# Patient Record
Sex: FEMALE | Race: AMERICAN INDIAN OR ALASKA NATIVE
[De-identification: names, ages, dates, MRNs, and addresses within clinical notes are randomized per-mention and may not be internally consistent; named-entity substitution may affect disease eponyms.]

---

## 2018-09-22 NOTE — PDOC
History of Present Illness





- General


Chief Complaint: Chest Pain


Stated Complaint: PAIN, ACUTE





- History of Present Illness


Initial Comments: 


The patient is a 58F w/ a history of T2DM and HTN who presents for 2d of 

intermittent, sharp/burning, non-radiating, left-sided chest pain, that 

radiates towards her flank as a 'tingling' sensation. The patient is not able 

to identify any exacerbating or alleviating factors. The patient has never had 

pain like this before. She states it most commonly occurs at rest and will 

resolve spontaneously from minutes to hours. The patient denies fevers/chills, 

SOB, abdominal pain, N/V/C/D, dysuria, or blood in her urine or stool. She 

denies rash, injury, or recent cough/illness.


18 19:43








Past History





- Past Medical History


Allergies/Adverse Reactions: 


 Allergies











Allergy/AdvReac Type Severity Reaction Status Date / Time


 


Sulfa (Sulfonamide Allergy Intermediate Hives Verified 18 20:24





Antibiotics)     











Home Medications: 


Ambulatory Orders





metFORMIN XR [Glucophage Xr -] 500 mg PO BID 04/28/15 


Aspirin 81 mg PO DAILY 18 


Liraglutide [Victoza -] 1.2 mg SQ DAILY@0700 18 


Metoprolol Succinate [Toprol Xl] 25 mg PO DAILY 18 








COPD: No


Diabetes: Yes


HTN: Yes


Hypercholesterolemia: Yes





- Suicide/Smoking/Psychosocial Hx


Smoking History: Never smoked


Hx Alcohol Use: No


Drug/Substance Use Hx: No





**Review of Systems





- Review of Systems


Able to Perform ROS?: Yes


Comments:: 





GENERAL/CONSTITUTIONAL: No fever or chills. No weakness


HEAD, EYES, EARS, NOSE AND THROAT: No acute change in vision. No ear pain or 

discharge. No sore throat


CARDIOVASCULAR: per HPI


RESPIRATORY: No cough, wheezing, or hemoptysis


GASTROINTESTINAL: No nausea, vomiting, diarrhea or constipation


GENITOURINARY: No dysuria, frequency, or change in urination


MUSCULOSKELETAL: No joint or muscle swelling or pain. No neck or back pain


SKIN: No rash


NEUROLOGIC: No vertigo, loss of consciousness, or change in strength/sensation


HEMATOLOGIC/LYMPHATIC: No anemia, easy bleeding, or history of blood clots


ALLERGIC/IMMUNOLOGIC: No hives or skin allergy


18 20:32





Is the patient limited English proficient: No





*Physical Exam





- Vital Signs


 Last Vital Signs











Temp Pulse Resp BP Pulse Ox


 


 98.7 F   90   20   156/79   99 


 


 18 19:00  18 19:00  18 19:00  18 19:00  18 19:00














- Physical Exam


Comments: 





GENERAL: Awake, alert, and fully oriented, in no acute distress


HEAD: No signs of trauma, normocephalic, atraumatic 


EYES: PERRL, EOMI, sclera anicteric, conjunctiva clear


ENT: Hearing grossly normal, nares patent, oropharynx clear without exudates. 

Moist mucosa


NECK: Normal ROM, supple, no lymphadenopathy


CHEST: No rash, no TTP on lateral compression 


LUNGS: No distress, speaks full sentences, clear to auscultation bilaterally


HEART:Regular rate and rhythm, normal S1 and S2, no murmurs appreciated, 

peripheral pulses normal and equal bilaterally


ABDOMEN: Soft, nontender, normoactive bowel sounds.  No guarding, no rebound.  

No masses


EXTREMITIES : Normal inspection, Normal range of motion, no edema.  No clubbing 

or cyanosis


NEUROLOGICAL: Cranial nerves II through XII grossly intact.  Normal speech, 

normal gait, no focal sensorimotor deficits


SKIN: Warm, Dry, normal turgor, no rashes or lesions noted





18 20:33








**Heart Score/ECG Review





- History


History: Slightly suspicious





- Electrocardiogram


EKG: Normal





- Age


Age: 45-65





- Risk Factors


Risk Factors Heart Score: Yes Hx Hypertension, Yes Hx Diabetes, Yes Hx Obesity


Based on the list above the patient has:: >/=3 risk factors or Hx 

atherosclerotic disease





- Troponin


Troponin: </= normal limit





- Score


Heart Score - Total: 3





ED Treatment Course





- LABORATORY


CBC & Chemistry Diagram: 


 18 19:40





 18 19:40





- RADIOLOGY


Radiology Studies Ordered: 














 Category Date Time Status


 


 CHEST PA & LAT [RAD] Stat Radiology  18 19:38 Ordered














Medical Decision Making





- Medical Decision Making


The patient is a 58F w/ a history of T2DM and HTN who presents with 2d of L-

sided, intermittent, sharp/burning chest pain that occurs mostly at rest.


FHx sig for F  2/2 MI and Brother with hx of MI


HEART Score 3





ED Course


CMP, CBC, Cardiac Profile


ECG


CXR





Will plan for Obs Tele admission for ACS r/o given strong family history and 

new onset of symptoms


18 19:53





Initial Trop I neg


CXR w/o evidence of acute cardiopulmonary pathology


18 20:40





Lytes wnl


No leukocytosis


ECG NSR without signs of acute ischemia


Patient states pain is still 4-5/10 at this time but does not desire pain 

medication. She is laying comfortably in bed, in no distress, speaking in full 

sentences on room air.





Dispo: Tele obs for ACS r/o. Will repeat Trop I at three hours





18 21:04








*DC/Admit/Observation/Transfer


Diagnosis at time of Disposition: 


 ACS (acute coronary syndrome)





HTN (hypertension)


Qualifiers:


 Hypertension type: unspecified Qualified Code(s): I10 - Essential (primary) 

hypertension





T2DM (type 2 diabetes mellitus)


Qualifiers:


 Diabetes mellitus long term insulin use: unspecified long term insulin use 

status Diabetes mellitus complication status: with unspecified complications 

Qualified Code(s): E11.8 - Type 2 diabetes mellitus with unspecified 

complications








- Discharge Dispostion


Condition at time of disposition: Good


Decision to Admit order: Yes





- Referrals





- Patient Instructions





- Post Discharge Activity

## 2018-09-22 NOTE — PN
Teaching Attending Note


Name of Resident: Juan Manuel Mckeon





ATTENDING PHYSICIAN STATEMENT





I saw and evaluated the patient.


I reviewed the resident's note and discussed the case with the resident.


I agree with the resident's findings and plan as documented.








SUBJECTIVE:


Patient is a 58 year old woman with a history of NIDDM and HTN who presents for 

intermittent, sharp/burning, non-radiating, left-sided chest pain for two days. 

Pain radiates towards her flank as a 'tingling' sensation. The patient is not 

able to identify any exacerbating or alleviating factors. The patient has never 

had pain like this before. She states it most commonly occurs at rest and will 

resolve spontaneously from minutes to hours. The patient denies fevers/chills, 

SOB, abdominal pain, nausea, vomiting or dysuria. She has a FH of CAD.





OBJECTIVE:


Alert


 Vital Signs











 Period  Temp  Pulse  Resp  BP Sys/Clarke  Pulse Ox


 


 Last 24 Hr  98.7 F  82-90  17-20  156-170/79-95  98-99








HEENT: No Jaundice, eye redness or discharge, PERRLA, EOMI. Normocephalic, 

atraumatic. External ears are normal and hearing is grossly intact. No nasal 

discharge.


Neck: Supple, nontender. No palpable adenopathy or thyromegaly. No JVD


Chest: Good effort. Clear to auscultation and percussion.


Heart: Regular. No S3, rub or murmur


Abdomen: Not distended, soft, nontender and no HSM. No rebound or guarding.  

Normoactive bowel sounds.


Ext: Peripheral pulses intact. No leg edema.


Skin: Warm and dry. No petechiae, rash or ecchymosis.


Neuro: Alert. Oriented x3. CN 2-12 grossly intact. Sensation grossly intact in 

all four extremities and DTR are symmetric.


 Home Medications











 Medication  Instructions  Recorded


 


metFORMIN XR [Glucophage Xr -] 500 mg PO BID 04/28/15


 


Aspirin 81 mg PO DAILY 09/22/18


 


Liraglutide [Victoza -] 1.2 mg SQ DAILY@0700 09/22/18


 


Metoprolol Succinate [Toprol Xl] 25 mg PO DAILY 09/22/18








 Abnormal Lab Results











  09/22/18 09/22/18





  19:40 19:40


 


WBC  12.6 H 


 


MCV  76.6 L 


 


MCH  25.3 L 


 


Chloride   108 H


 


Total Bilirubin   1.1 H








ASSESSMENT AND PLAN:


1. Chest pain - Pain is atypical, but she has risk factors as well as a family 

history of CAD. No acute changes of ACS on EKG. Will admit to telemetry to rule 

out ACS. Get ECHO, fasting lipids and consult cardiology. Add Lisinopril 20 mg 

po bid and strive for normotension. Leukocytosis is unexplained. CXR show RLL 

atelectasis. She is afebrile. Will repeat CBC. 





2. DM - For now, we will hold the home diabetes drugs and implement sliding 

scale insulin regimen. Provide comprehensive diabetes care with patient 

teaching and counseling about the importance of euglycemia, eye care and foot 

care.





3. Obesity - Will provide patient all the necessary assistance , counseling and 

positive reinforcement to facilitate weight loss. Consult dietician.





4. DVT prophylaxis - Lovenox 40 mg SQ q 24 hours.





5. Advance directives - Full code

## 2018-09-22 NOTE — PDOC
Attending Attestation





- HPI


HPI: 





09/22/18 20:55


The patient is a 58-year-old female with past medical history significant for 

HTN, HLD, and DM presents to the emergency department with 2 days of chest 

pain. The patient reports the symptoms have been intermittent since onset. The 

patient indicates the symptoms are located on the L. lower chest pain, burning 

in character, with intermittent diffusion across the chest. The patient states 

the pain is strongest while shes driving or sleeping on her side, with a 

tingling sensation radiating down the L. flank region.  The patient reports the 

symptoms are slightly improved by remaining still and denies exacerbation with 

deep breathing. The patient reports an additional concern of a headache. Denies 

recent illness, shortness of breath, nausea, vomiting, diarrhea, constipation, 

cough or injury to the chest wall. 


Allergies: Sulfa 


Social history: No past or present use of tobacco, alcohol or recreational drug 

use. 


Surgical history: None reported. 


PCP: Gaetano Koehler








- Physicial Exam


PE: 


09/22/18 20:57


GENERAL: The patient is in no acute distress.


LUNGS: Breath sounds equal, clear to auscultation bilaterally. No


wheezes, and no crackles.


HEART:Regular rate and rhythm, normal S1 and S2 without murmur, rub or gallop.


ABDOMEN: Soft, nontender, normoactive bowel sounds. No guarding, no


rebound. No masses palpable.


EXTREMITIES: Normal range of motion, no edema. No clubbing or cyanosis. No 

erythema, or tenderness.





- Medical Decision Making








09/22/18 20:55





Documentation prepared by Kristine Maharaj, acting as medical scribe for Jolanta Hare MD. 





<Kristine Maharaj - Last Filed: 09/22/18 20:57>





- Resident


Resident Name: Roman Russo





- ED Attending Attestation


I have performed the following: I have examined & evaluated the patient, The 

case was reviewed & discussed with the resident, I agree w/resident's findings 

& plan, Exceptions are as noted





- Medical Decision Making





09/22/18 20:41


59 yo F presenting to the ER with 3 days intermittent chest pain


No shortness of breath, nausea, diaphoresis


Pt h/o HTN, DM, strong family history.





Will plan to place on observation


After labs


09/22/18 20:54


 Laboratory Tests











  09/22/18





  19:40


 


Creatine Kinase  66


 


Troponin I  < 0.02








CXR:


EKG: Twelve-lead EKG was performed and reviewed by me. There is normal sinus 

rhythm with a normal rate. The axis is normal. The intervals are normal. There 

are no ST or T wave abnormalities.





Impression: Normal twelve-lead EKG





09/22/18 22:05


Place on observation


Clinical Impression: chest pain, initial presentation 





<Jolanta Hare - Last Filed: 09/24/18 07:51>





**Heart Score/ECG Review





- History


History: Slightly suspicious





- Electrocardiogram


EKG: Normal





- Age


Age: 45-65





- Risk Factors


Risk Factors Heart Score: Yes Hx Hypertension, Yes Hx Diabetes, Yes Hx Obesity


Based on the list above the patient has:: >/=3 risk factors or Hx 

atherosclerotic disease





- Troponin


Troponin: </= normal limit





- Score


Heart Score - Total: 3





<Jolanta Hare - Last Filed: 09/24/18 07:51>

## 2018-09-22 NOTE — HP
CHIEF COMPLAINT: chest pain





PCP:





HISTORY OF PRESENT ILLNESS:


Patient is a 58 year old female with history significant for anxiety, depression

, HTN, HLD, DM, presents with chest pain ongoing for the past 2-3 days. Pain is 

sharp, localized to the midline chest, as well as left lateral chest, and 

radiates interiorly along the flank, described as "tingling" in nature. States 

that the pain occurs mainly during rest when she is lying down and is 

associated with headache. Headache described as tightness around the head, with 

blurry vision that resolves shortly afterwards. Denies falls, trauma to head, 

loss of consciousness. Denies fevers, chills, shortness of breath, abdominal 

pain, nausea, vomiting, diarrhea, constipation, dysuria, hematuria.








ER course was notable for:


(1) CBC, CMP


(2) EKG, Troponin 0.02 


(3) CXR





Recent Travel:





PAST MEDICAL HISTORY: anxiety, depression, HTN, HLD, DM


PAST SURGICAL HISTORY:





Social History:


Smoking: denies


Alcohol: denies


Drugs: denies





Family History:


Allergies





Sulfa (Sulfonamide Antibiotics) Allergy (Intermediate, Verified 09/22/18 20:24)


 Hives








HOME MEDICATIONS:


 Home Medications











 Medication  Instructions  Recorded


 


metFORMIN XR [Glucophage Xr -] 500 mg PO BID 04/28/15


 


Aspirin 81 mg PO DAILY 09/22/18


 


Liraglutide [Victoza -] 1.2 mg SQ DAILY@0700 09/22/18


 


Metoprolol Succinate [Toprol Xl] 25 mg PO DAILY 09/22/18








REVIEW OF SYSTEMS


CONSTITUTIONAL: 


Absent:  fever, chills, diaphoresis, generalized weakness.


HEENT: 


ADmits: blurry vision. Absent:  rhinorrhea, nasal congestion, throat swelling, 

difficulty swallowing, mouth swelling,


CARDIOVASCULAR: 


Admits: chest pain, palpitations, Absent: syncope, irregular heart rate, 

peripheral edema


RESPIRATORY: 


Absent: cough, shortness of breath, wheezing, hemoptysis


GASTROINTESTINAL:


Absent: abdominal pain, abdominal distension, nausea, vomiting, diarrhea, 

constipation, melena, hematochezia


GENITOURINARY: 


Absent: dysuria, frequency, urgency, hesitancy, hematuria,


SKIN: 


Absent: rash, pruritis, pallor


NEUROLOGIC: 


Admits headache, parasthesias. Absent: focal weakness, dizziness, unsteady gait

, bladder or bowel incontinence


PSYCHIATRIC: 


Admits: anxiety, depression. Absent: suicidal or homicidal ideation, 

hallucinations.








PHYSICAL EXAMINATION


 Vital Signs - 24 hr











  09/22/18 09/22/18





  19:00 20:28


 


Temperature 98.7 F 


 


Pulse Rate 90 82


 


Pulse Rate [  82





Left Radial]  


 


Respiratory 20 17





Rate  


 


Blood Pressure 156/79 


 


Blood Pressure  170/95





[Right]  


 


O2 Sat by Pulse 99 98





Oximetry (%)  











GENERAL: Awake, alert, and fully oriented, in no acute distress.


HEAD: Normal with no signs of trauma.


EYES: Pupils equal, round and reactive to light, extraocular movements intact, 

sclera icteric b/l, conjunctiva clear. 


EARS, NOSE, THROAT: Ears normal, nares patent, oropharynx clear without 

exudates. Moist mucous membranes.


NECK: Normal range of motion, supple without lymphadenopathy.


LUNGS: Breath sounds equal, clear to auscultation bilaterally. No wheezes, and 

no crackles. No accessory muscle use.


HEART: Regular rate and rhythm, normal S1 and S2 with 2/6 holosystolic murmur 

at left sternal border.


Chest pain partially reproducible upon palpation, however not at full intensity 

that she feels.


ABDOMEN: Soft, nontender, not distended, normoactive bowel sounds, no guarding, 

no rebound, no masses.  No hepatomegaly or  splenomegaly. 


MUSCULOSKELETAL: Normal range of motion at all joints. No bony deformities or 

tenderness.


UPPER EXTREMITIES: 2+ pulses, warm. Strength 5/5 b/l upper extremities.


LOWER EXTREMITIES: 2+ pulses, warm, well-perfused. No calf tenderness b/l. No 

peripheral edema b/l. Strength 5/5 b/l lower extremities. 


NEUROLOGICAL:  Cranial nerves II-XII intact. Normal speech.  


PSYCHIATRIC: Cooperative. Good eye contact. Appropriate mood and affect upon my 

encounter.


SKIN: Warm, dry, normal turgor, no rashes or lesions noted.





 Laboratory Results - last 24 hr











  09/22/18 09/22/18





  19:40 19:40


 


WBC  12.6 H 


 


RBC  4.64 


 


Hgb  11.8 


 


Hct  35.6 


 


MCV  76.6 L 


 


MCH  25.3 L 


 


MCHC  33.0 


 


RDW  14.1 


 


Plt Count  350 


 


MPV  8.6 


 


Sodium   142


 


Potassium   4.4


 


Chloride   108 H


 


Carbon Dioxide   26


 


Anion Gap   8


 


BUN   10


 


Creatinine   0.6


 


Creat Clearance w eGFR   > 60


 


Random Glucose   75


 


Calcium   9.5


 


Total Bilirubin   1.1 H


 


AST   16


 


ALT   31


 


Alkaline Phosphatase   79


 


Creatine Kinase   66


 


Troponin I   < 0.02


 


Total Protein   7.7


 


Albumin   3.9











ASSESSMENT/PLAN:


Patient is a 58 year old female with history significant for anxiety, depression

, HTN, HLD, DM, presents with chest pain ongoing for the past 2-3 days.





Chest pain


-Atypical, partially reproducible upon palpation. Associated with anxiety-

provoking thoughts, occurign at rest.  


-EKG showed NSR at 86 BPM without ischemic changes


-First troponin 0.02


-F/U serial troponins, and EKG


-Cardiac monitoring


-F/U cardiac ECHO


-F/U lipid panel


-F/U cardiology consult (Dr. Joanna Krause)





Leukocytosis


-Unclear etiology


-CXR shows ?RLL atelectasis. Will F/U official read.


-F/U UA





Hypertension


-Continue Metoprolol 25mg PO daily


-Lisinopril 20mg PO BID





Diabetes


-Hold oral hypoglycemics


-ISS


-BGM ACHS





FEN


-No IV fluids. Encourage judicious oral hydration


-Will follow CMP


-Diabetic diet





Prophylaxis


-Lovenox 40mg subq TID





Disposition


-Telemetry observation





Visit type





- Emergency Visit


Emergency Visit: Yes


ED Registration Date: 09/22/18


Care time: The patient presented to the Emergency Department on the above date 

and was hospitalized for further evaluation of their emergent condition.





- New Patient


This patient is new to me today: Yes


Date on this admission: 09/23/18





- Critical Care


Critical Care patient: No





Hospitalist Screening





- Colonoscopy Questionnaire


Colonoscopy Questionnaire: 





Colonoscopy Questionnaire








-   Patient:


50 - 75 years old and never had a screening colonoscopy: Unknown


History of colon or rectal polyps, or CA: Unknown


History of IBD, Crohn's disease or UC: Unknown


History of abdominal radiation therapy as a child: Unknown





-   Relative:


1 with colon or rectal CA, or polyps at age 60 or younger: Unknown


Colon or rectal CA diagnosed at age 45 or younger: Unknown


Multiple relatives with colon or rectal CA: Unknown





-   Outcome:


Screening Result: Negative Screen

## 2018-09-23 NOTE — EKG
Test Reason : 

Blood Pressure : ***/*** mmHG

Vent. Rate : 089 BPM     Atrial Rate : 089 BPM

   P-R Int : 158 ms          QRS Dur : 076 ms

    QT Int : 376 ms       P-R-T Axes : 044 003 037 degrees

   QTc Int : 457 ms

 

NORMAL SINUS RHYTHM

NORMAL ECG

NO PREVIOUS ECGS AVAILABLE

Confirmed by MD Mckeon Daniel ([id number]) on 9/23/2018 5:06:40 PM

 

Referred By:             Confirmed By:Irving Mckeon MD

## 2018-09-23 NOTE — PN
Progress Note (short form)





- Note


Progress Note: 





Subjective:


No fever or chills. No abd pain, No HA , nO CP , no SOB. she reported few 

episodes of sharp CP under her L breast with tingling in LUQ in past 3 days . 

pain was severe to 10 /10 at some point. it would last 2-3 min and resolve. no 

relation to exertion.  never had Cp before. has strong family h/ premature CAD. 

had stress test in June /17 and echo and was told it was normal. 





Objective:








Vital Signs:


 Last Vital Signs











Temp Pulse Resp BP Pulse Ox


 


 98 F   88   18   140/80   98 


 


 09/23/18 09:00  09/23/18 09:00  09/23/18 09:00  09/23/18 09:00  09/23/18 09:00








 Laboratory Results - last 24 hr











  09/22/18 09/22/18 09/22/18





  19:40 19:40 23:00


 


WBC  12.6 H  


 


RBC  4.64  


 


Hgb  11.8  


 


Hct  35.6  


 


MCV  76.6 L  


 


MCH  25.3 L  


 


MCHC  33.0  


 


RDW  14.1  


 


Plt Count  350  


 


MPV  8.6  


 


Absolute Neuts (auto)   


 


Neutrophils %   


 


Lymphocytes %   


 


Monocytes %   


 


Eosinophils %   


 


Basophils %   


 


Nucleated RBC %   


 


Sodium   142 


 


Potassium   4.4 


 


Chloride   108 H 


 


Carbon Dioxide   26 


 


Anion Gap   8 


 


BUN   10 


 


Creatinine   0.6 


 


Creat Clearance w eGFR   > 60 


 


POC Glucometer   


 


Random Glucose   75 


 


Calcium   9.5 


 


Phosphorus   


 


Magnesium   


 


Total Bilirubin   1.1 H 


 


AST   16 


 


ALT   31 


 


Alkaline Phosphatase   79 


 


Creatine Kinase   66 


 


Troponin I   < 0.02 


 


Total Protein   7.7 


 


Albumin   3.9 


 


Triglycerides   


 


Cholesterol   


 


Total LDL Cholesterol   


 


HDL Cholesterol   


 


Urine Color    Ltyellow


 


Urine Appearance    Clear


 


Urine pH    5.0


 


Ur Specific Gravity    1.015


 


Urine Protein    Negative


 


Urine Glucose (UA)    Negative


 


Urine Ketones    Negative


 


Urine Blood    Negative


 


Urine Nitrite    Negative


 


Urine Bilirubin    Negative


 


Urine Urobilinogen    Negative


 


Ur Leukocyte Esterase    Negative














  09/23/18 09/23/18 09/23/18





  00:31 05:03 05:03


 


WBC   12.6 H 


 


RBC   4.45 


 


Hgb   11.2 


 


Hct   34.1 


 


MCV   76.7 L 


 


MCH   25.2 L 


 


MCHC   32.9 


 


RDW   14.0 


 


Plt Count   285 


 


MPV   8.2 


 


Absolute Neuts (auto)   5.6 


 


Neutrophils %   44.8 


 


Lymphocytes %   46.1 H 


 


Monocytes %   6.2 


 


Eosinophils %   2.3 


 


Basophils %   0.6 


 


Nucleated RBC %   0 


 


Sodium    139


 


Potassium    4.0


 


Chloride    105


 


Carbon Dioxide    26


 


Anion Gap    7 L


 


BUN    14


 


Creatinine    0.6


 


Creat Clearance w eGFR    > 60


 


POC Glucometer   


 


Random Glucose    98


 


Calcium    8.9


 


Phosphorus    4.5


 


Magnesium    1.7 L


 


Total Bilirubin    1.0


 


AST    18


 


ALT    29


 


Alkaline Phosphatase    73


 


Creatine Kinase  63  


 


Troponin I  < 0.02  


 


Total Protein    7.0


 


Albumin    3.4


 


Triglycerides    143


 


Cholesterol    142


 


Total LDL Cholesterol    88


 


HDL Cholesterol    41


 


Urine Color   


 


Urine Appearance   


 


Urine pH   


 


Ur Specific Gravity   


 


Urine Protein   


 


Urine Glucose (UA)   


 


Urine Ketones   


 


Urine Blood   


 


Urine Nitrite   


 


Urine Bilirubin   


 


Urine Urobilinogen   


 


Ur Leukocyte Esterase   














  09/23/18 09/23/18





  05:03 06:44


 


WBC  


 


RBC  


 


Hgb  


 


Hct  


 


MCV  


 


MCH  


 


MCHC  


 


RDW  


 


Plt Count  


 


MPV  


 


Absolute Neuts (auto)  


 


Neutrophils %  


 


Lymphocytes %  


 


Monocytes %  


 


Eosinophils %  


 


Basophils %  


 


Nucleated RBC %  


 


Sodium  


 


Potassium  


 


Chloride  


 


Carbon Dioxide  


 


Anion Gap  


 


BUN  


 


Creatinine  


 


Creat Clearance w eGFR  


 


POC Glucometer   103


 


Random Glucose  


 


Calcium  


 


Phosphorus  


 


Magnesium  


 


Total Bilirubin  


 


AST  


 


ALT  


 


Alkaline Phosphatase  


 


Creatine Kinase  


 


Troponin I  


 


Total Protein  


 


Albumin  


 


Triglycerides  Cancelled 


 


Cholesterol  Cancelled 


 


Total LDL Cholesterol  Cancelled 


 


HDL Cholesterol  Cancelled 


 


Urine Color  


 


Urine Appearance  


 


Urine pH  


 


Ur Specific Gravity  


 


Urine Protein  


 


Urine Glucose (UA)  


 


Urine Ketones  


 


Urine Blood  


 


Urine Nitrite  


 


Urine Bilirubin  


 


Urine Urobilinogen  


 


Ur Leukocyte Esterase  














Physical Exam:


NAD


Cv: RRR, no MRG 


Lungs: CTAB 


Ext: no edema 


no TTP over chest wall 








Assessment/Plan:





59 y/o lady with h/o HTN, depression, hyperlipidemia, anxiety, DM , and family h

/o premature CAD, who presented with PC 





1- CP: CP characteristics indicate atypical CP. EKG with sinus rhythm with no 

ischemic changes. trop nl x2. 


she has strong family hx of premature CAd and has risk factors, but she had a 

stress test a year ago, which is reassuring 


- tele 


- I don't feel a stress test is indicated but will confirm with card 


- cont her BB , ASA , ARB 


- if no stress test to be done, then card f/u as out pt 





2- HTN: cont ARB and BB 


3- Dm  ; SSI here 





meds were confirmed with her, and updated in [facility]. 


if nO stress test is indicated  , then dc home . 











Visit type





- Emergency Visit


Emergency Visit: Yes


ED Registration Date: 09/22/18


Care time: The patient presented to the Emergency Department on the above date 

and was hospitalized for further evaluation of their emergent condition.





- New Patient


This patient is new to me today: Yes


Date on this admission: 09/23/18





- Critical Care


Critical Care patient: No

## 2018-09-24 NOTE — CON.CARD
Consult


Consult Specialty:: Cardiology


Referred by:: Hospitalist Medicine


Reason for Consultation:: Atypical chest pain





- History of Present Illness


Chief Complaint: Atypical chest pain


History of Present Illness: 


Patient is a 58 year old woman with a history of NIDDM, hyperlipidemia and HTN 

who presented for intermittent, sharp/burning, stabbing, non-exertional left-

sided chest pain for two days. Pain radiates towards her flank as a 'tingling' 

sensation. The patient is not able to identify any exacerbating or alleviating 

factors. The patient has never had pain like this before. She states it most 

commonly occurs at rest and will resolve spontaneously from minutes to hours. 

The patient denies fevers/chills, SOB, near or true syncope, palpitations, 

orthopnea, PND or LE edema. She has a FH of premature CAD.





PMD: Gaetano Pickett [phone]





- History Source


History Provided By: Patient


Limitations to Obtaining History: No Limitations





- Alcohol/Substance Use


Hx Alcohol Use: No





- Smoking History


Smoking history: Never smoked





Home Medications





- Allergies


Allergies/Adverse Reactions: 


 Allergies











Allergy/AdvReac Type Severity Reaction Status Date / Time


 


Sulfa (Sulfonamide Allergy Intermediate Hives Verified 09/22/18 20:24





Antibiotics)     














- Home Medications


Home Medications: 


Ambulatory Orders





metFORMIN XR [Glucophage Xr -] 1,000 mg PO BID 04/28/15 


Aspirin 81 mg PO DAILY 09/22/18 


Liraglutide [Victoza -] 1.2 mg SQ DAILY@0700 09/22/18 


Metoprolol Succinate [Toprol Xl] 25 mg PO DAILY 09/22/18 


Atorvastatin Ca [Lipitor] 20 mg PO HS 09/23/18 


Citalopram Hydrobromide [Celexa -] 40 mg PO DAILY 09/23/18 


Clonazepam [Klonopin] 0.5 mg PO BID 09/23/18 


Olmesartan Medoxomil 20 mg PO DAILY 09/23/18 











Family Disease History





- Family Disease History


Family Disease History: Heart Disease: Father (MI), Brother (CABG), Son (MI)





Review of Systems





- Review of Systems


Constitutional: reports: No Symptoms


Eyes: reports: No Symptoms


HENT: reports: No Symptoms


Neck: reports: No Symptoms


Cardiovascular: reports: Chest Pain


Respiratory: reports: No Symptoms


Gastrointestinal: reports: No Symptoms


Genitourinary: reports: No Symptoms


Musculoskeletal: reports: No Symptoms


Integumentary: reports: No Symptoms


Neurological: reports: No Symptoms


Endocrine: reports: No Symptoms


Vital Signs: 


 Vital Signs











Temperature  97.6 F   09/24/18 05:00


 


Pulse Rate  79   09/24/18 05:00


 


Respiratory Rate  18   09/24/18 05:00


 


Blood Pressure  131/75   09/24/18 05:00


 


O2 Sat by Pulse Oximetry (%)  97   09/23/18 21:00











Constitutional: Yes: No Distress, Calm, Thin


Neck: Yes: Supple


Respiratory: Yes: Regular, CTA Bilaterally


Gastrointestinal: Yes: Normal Bowel Sounds, Soft


Cardiovascular: Yes: Regular Rate and Rhythm


JVD: No


Carotid Bruit: No


Heart Sounds: Yes: S1, S2


Edema: No





- Other Data


Labs, Other Data: 


 CBC, BMP





 09/23/18 05:03 





 09/23/18 05:03 











NSR @ 89 without ST-T changes


Tele: NSR


Ejection Fraction %: LVEF > or = 40 %





Imaging





- Results


Chest X-ray: Report Reviewed (NAD)





Problem List





- Problems


(1) Hyperlipidemia


Code(s): E78.5 - HYPERLIPIDEMIA, UNSPECIFIED   


Qualifiers: 


   Hyperlipidemia type: pure hypercholesterolemia   Qualified Code(s): E78.00 - 

Pure hypercholesterolemia, unspecified; E78.0 - Pure hypercholesterolemia   





(2) Atypical chest pain


Code(s): R07.89 - OTHER CHEST PAIN   





(3) Family history of premature coronary artery disease


Code(s): Z82.49 - FAMILY HX OF [facility]SC[facility]M HEART DIS AND [facility]   





(4) HTN (hypertension)


Code(s): I10 - ESSENTIAL (PRIMARY) HYPERTENSION   


Qualifiers: 


   Hypertension type: essential hypertension   Qualified Code(s): I10 - 

Essential (primary) hypertension   





(5) T2DM (type 2 diabetes mellitus)


Code(s): E11.9 - TYPE 2 DIABETES MELLITUS WITHOUT COMPLICATIONS   


Qualifiers: 


   Diabetes mellitus long term insulin use: without long term use   Diabetes 

mellitus complication status: with unspecified complications   Qualified Code(s)

: E11.8 - Type 2 diabetes mellitus with unspecified complications   





Assessment/Plan


1. Atypical chest pain syndrome


2. HTN


3. Hyperlipidemia


4. Type 2 DM


5. Family h/o premature CAD





P:1. Ruled out for MI


2. Review outpatient records of echo and stress testing performed last year at 

Dr. Pickett office


3. Continue ASA 81 qd, Toprol XL 25 qd, Lipitor 20 qd, Benicar 20 qd or 

equivalent


4. D/c planning with f/u in office [phone]


5. Thank you for consultative opportunity

## 2018-09-24 NOTE — ECHO
______________________________________________________________________________



Name: YAHAIRA BANKS                                 Exam:Adult Echocardiogram

MRN: [id number]         Study Date: 2018 08:30 AM

Age: 58 yrs

______________________________________________________________________________



Reason For Study: Chest pain

Height: 65 in        Weight: 183 lb        BSA: 1.9 m2



______________________________________________________________________________



MMode/2D Measurements & Calculations

IVSd: 1.2 cm                                        Ao root diam: 2.8 cm

LVIDd: 3.5 cm                                       LA dimension: 3.1 cm

LVIDs: 2.5 cm

LVPWd: 0.97 cm



_____________________________________________________

EDV(Teich): 49.9 ml                                 LAV (MOD-bp): 42.1 ml

ESV(Teich): 23.2 ml



Doppler Measurements & Calculations

MV E max blade: 51.4 cm/sec                                 TR max balde: 212.2 cm/sec

MV A max blade: 88.3 cm/sec                                 TR max P.3 mmHg

MV E/A: 0.58

MV dec time: 0.15 sec



___________________________________________________________

Med Peak E' Blade: 6.0 cm/sec

Med E/e': 8.6

Lat Peak E' Blade: 7.8 cm/sec

Lat E/e': 6.6





______________________________________________________________________________

Procedure

The study was technically adequate with some images being suboptimal in quality.

Left Ventricle

The left ventricular size, thickness and function are normal. Ejection Fraction = 60-65%. Grade I wil
stolic

dysfunction, (abnormal relaxation pattern).

Right Ventricle

The right ventricle is normal in size and function.

Atria

Normal left and right atrial size and function.

Mitral Valve

The mitral valve is normal in structure and function. There is trace mitral regurgitation.

Tricuspid Valve

The tricuspid valve is not well visualized, but is grossly normal. There is trace tricuspid regurgita
tion.

There was insufficient TR detected to calculate RV systolic pressure.

Aortic Valve

The aortic valve is normal in structure and function. The aortic valve opens well. The aortic valve i
s

trileaflet. No aortic regurgitation is present.

Pulmonic Valve

The pulmonic valve is not well visualized.

Great Vessels

The aortic root is normal size.

Pericardium/Pleura

There is no pericardial effusion.



______________________________________________________________________________



Interpretation Summary

Compared to the prior echo report on 2016, there is no significant change. The left ventricular
 size,

thickness and function are normal

The right ventricle is normal in size and function.

There is trace mitral regurgitation.

There is trace tricuspid regurgitation.

Ejection Fraction = 60-65%.







Ceferino Romero MD 2018 05:00 PM

## 2018-09-24 NOTE — DS
Physical Exam: 


SUBJECTIVE: Patient seen and examined at bedside this morning. Denies any chest 

pain, palpitations, abdominal pain, nausea, vomiting diarrhea. Admits she is 

feeling much better, and has not had recurrence of chest pressure since 

admission. 








OBJECTIVE:





 Vital Signs











 Period  Temp  Pulse  Resp  BP Sys/Clarke  Pulse Ox


 


 Last 24 Hr  97.5 F-98 F  78-86  18-20  126-138/75-78  97-99








PHYSICAL EXAM





GENERAL: Awake, alert, and fully oriented, in no acute distress.


HEAD: Normal with no signs of trauma.


EYES: Pupils equal, round and reactive to light, extraocular movements intact, 

sclera icteric b/l, conjunctiva clear. 


EARS, NOSE, THROAT: Ears normal, nares patent, oropharynx clear without 

exudates. Moist mucous membranes.


NECK: Normal range of motion, supple without lymphadenopathy.


LUNGS: Breath sounds equal, clear to auscultation bilaterally. No wheezes, and 

no crackles. No accessory muscle use.


HEART: Regular rate and rhythm, normal S1 and S2 with 2/6 holosystolic murmur 

at left sternal border.


Chest pain partially reproducible upon palpation, however not at full intensity 

that she feels.


ABDOMEN: Soft, nontender, not distended, normoactive bowel sounds, no guarding, 

no rebound, no masses.  No hepatomegaly or  splenomegaly. 


MUSCULOSKELETAL: Normal range of motion at all joints. No bony deformities or 

tenderness.


UPPER EXTREMITIES: 2+ pulses, warm. Strength 5/5 b/l upper extremities.


LOWER EXTREMITIES: 2+ pulses, warm, well-perfused. No calf tenderness b/l. No 

peripheral edema b/l. Strength 5/5 b/l lower extremities. 


NEUROLOGICAL:  Cranial nerves II-XII intact. Normal speech.  


PSYCHIATRIC: Cooperative. Good eye contact. Appropriate mood and affect upon my 

encounter.


SKIN: Warm, dry, normal turgor, no rashes or lesions noted.





LABS


 Laboratory Results - last 24 hr











  09/23/18 09/23/18 09/24/18





  17:08 21:33 06:11


 


POC Glucometer  145  121  109














  09/24/18





  12:22


 


POC Glucometer  134











HOSPITAL COURSE:





Date of Admission:09/22/18





Date of Discharge: 09/24/18





Patient is a 58 year old female with history significant for anxiety, depression

, HTN, HLD, DM, presents with chest pain ongoing for the past 2-3 days. Pain 

was partially reproducible upon palpation. EKG showed NSR at 86 BPM without 

ischemic changes. Troponins were negative X2. ECHO showed LVEF of 65% with 

normal size thickness and function of LV. Patient had no further complaints of 

chest pain during admission. Cardiology consult discussed ruled out MI, and 

outpatient follow up. Patient discharged home with instruction to resume home 

medications, and follow up with primary care physician and cardiologist within 

one week of discharge. 


Minutes to complete discharge: 35





Discharge Summary


Reason For Visit: TYPE 2 DIABETES MELLITUS;CHEST PAIN;HYPERTENSION


Current Active Problems





Atypical chest pain (Acute)


Family history of premature coronary artery disease (Acute)


HTN (hypertension) (Chronic)


Hyperlipidemia (Chronic)


T2DM (type 2 diabetes mellitus) (Chronic)








Condition: Improved





- Instructions


Diet, Activity, Other Instructions: 


You were observed for chest pain. You EKG and blood studies and echocardiogram, 

showed no signs of a heart attack.





It is important that you follow with cardiologist Dr. Marshall within one week of 

discharge.


Please follow up with your primary care physician within one week after 

discharge. 





Continue taking your home medications as prescribed. 





Please return to the nearest emergency department if you experience any chest 

pain, palpitations, lightheadedness, change in vision, dizziness, or if you 

have any fall or trauma. 








Referrals: 


fawad Pickett [Other] (Fax number [phone])


Jimi Marshall MD [Staff Physician] - 1 Week


Disposition: HOME





- Home Medications


Comprehensive Discharge Medication List: 


Ambulatory Orders





metFORMIN XR [Glucophage Xr -] 1,000 mg PO BID 04/28/15 


Aspirin 81 mg PO DAILY 09/22/18 


Liraglutide [Victoza -] 1.2 mg SQ DAILY@0700 09/22/18 


Metoprolol Succinate [Toprol Xl] 25 mg PO DAILY 09/22/18 


Atorvastatin Ca [Lipitor] 20 mg PO HS 09/23/18 


Citalopram Hydrobromide [Celexa -] 40 mg PO DAILY 09/23/18 


Clonazepam [Klonopin] 0.5 mg PO BID 09/23/18 


Olmesartan Medoxomil 20 mg PO DAILY 09/23/18 








This patient is new to me today: No


Emergency Visit: Yes


ED Registration Date: 09/22/18


Care time: The patient presented to the Emergency Department on the above date 

and was hospitalized for further evaluation of their emergent condition.


Critical Care patient: No





- Discharge Referral


Referred to [facility] No

## 2018-09-24 NOTE — PN
Teaching Attending Note


Name of Resident: Juan Manuel Mckeon





ATTENDING PHYSICIAN STATEMENT





I saw and evaluated the patient.


I reviewed the resident's note and discussed the case with the resident.


I agree with the resident's findings and plan as documented.








SUBJECTIVE:


No fever or chills. No abd pain, no PC , no SOB , no palpitations .





OBJECTIVE:


NAD


Cv: RRR, no MRG 


Lungs: CTAB 


Ext: no edema 








Assessment/Plan:





59 y/o lady with h/o HTN, depression, hyperlipidemia, anxiety, DM , and family h

/o premature CAD, who presented with PC 





1- Atypical CP: 


- spoke to Dr. Dr. Pickett. records obtained. stress test in 3/16 with no 

signs of ischemia. Echo in 2016 with nl EF and no wall motion abnormalities 


- cont BB , ASA and ARB 


- follow with card as out pt 








2- HTN: cont ARB and BB 


3- Dm  ; SSI here . cont po meds at dc 





dispo: dc home today

## 2022-12-23 ENCOUNTER — HOSPITAL ENCOUNTER (EMERGENCY)
Dept: HOSPITAL 74 - JER | Age: 62
Discharge: HOME | End: 2022-12-23
Payer: COMMERCIAL

## 2022-12-23 VITALS
DIASTOLIC BLOOD PRESSURE: 79 MMHG | RESPIRATION RATE: 18 BRPM | TEMPERATURE: 101.1 F | SYSTOLIC BLOOD PRESSURE: 158 MMHG | HEART RATE: 108 BPM

## 2022-12-23 VITALS — BODY MASS INDEX: 26.6 KG/M2

## 2022-12-23 DIAGNOSIS — J09.X2: Primary | ICD-10-CM

## 2022-12-23 DIAGNOSIS — R50.9: ICD-10-CM

## 2022-12-23 DIAGNOSIS — R05.1: ICD-10-CM

## 2022-12-23 DIAGNOSIS — R09.81: ICD-10-CM

## 2025-07-10 PROBLEM — Z00.00 ENCOUNTER FOR PREVENTIVE HEALTH EXAMINATION: Status: ACTIVE | Noted: 2025-07-10

## 2025-07-21 ENCOUNTER — APPOINTMENT (OUTPATIENT)
Dept: CARDIOLOGY | Facility: CLINIC | Age: 65
End: 2025-07-21